# Patient Record
Sex: FEMALE | Race: WHITE
[De-identification: names, ages, dates, MRNs, and addresses within clinical notes are randomized per-mention and may not be internally consistent; named-entity substitution may affect disease eponyms.]

---

## 2020-03-01 ENCOUNTER — HOSPITAL ENCOUNTER (EMERGENCY)
Dept: HOSPITAL 88 - ER | Age: 80
Discharge: HOME | End: 2020-03-01
Payer: COMMERCIAL

## 2020-03-01 VITALS — WEIGHT: 116 LBS | BODY MASS INDEX: 20.55 KG/M2 | HEIGHT: 63 IN
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
## 2020-03-01 DIAGNOSIS — S02.80XA: Primary | ICD-10-CM
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
## 2020-03-01 DIAGNOSIS — I10: ICD-10-CM
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
## 2020-03-01 PROCEDURE — 99283 EMERGENCY DEPT VISIT LOW MDM: CPT

## 2020-03-01 PROCEDURE — 70486 CT MAXILLOFACIAL W/O DYE: CPT

## 2020-03-01 PROCEDURE — 70450 CT HEAD/BRAIN W/O DYE: CPT

## 2020-03-01 PROCEDURE — 72125 CT NECK SPINE W/O DYE: CPT

## 2020-03-01 NOTE — DIAGNOSTIC IMAGING REPORT
Examination: CT Face without Contrast

History:Fall with face injury and right eye laceration. 

Comparison studies: None



Technique:

Axial images were obtained through the maxillofacial region. Coronal and

sagittal reconstructions obtained from the axial data. 

Dose modulation, iterative reconstruction, and/or weight based adjustment of

the mA/kV was utilized to reduce the radiation dose to as low as reasonably

achievable. 

Intravenous contrast: None



Findings:



Soft tissues: 

Right periorbital soft tissue hematoma. 



Bones: 

Age indeterminate right lateral orbital wall fracture.

Chronic, healed fracture of the right zygomatic arch.



Orbits: 

Globes: Intact

Extra or intraconal abnormalities: None.



Paranasal sinuses:

Mild inflammatory mucosal thickening of the bilateral maxillary sinuses and

ethmoid air cells. 



Nasal cavity:

Patent. 

No septal deviation.



Incidental finding:

Bilateral facet arthropathy from C2-C3 through C5-C6 with disc osteophyte

complex at C3-C4 and C4-C5 with mild to moderate right foraminal narrowing.



IMPRESSION: 



1.  Right periorbital soft tissue hematoma with age indeterminate right lateral

orbital wall fracture. Intact right globe. 



2.  Chronic, healed fracture of the right zygomatic arch.



Signed by: Dr. Vida Briggs M.D. on 3/1/2020 10:26 AM

## 2020-03-01 NOTE — NUR
Recieved pt in rm 6 s/p mechanical trip and fall, no loc. Pt noted to have 
large hematoma to R eye. During triage pt noted to have hypertension. Medicated 
in triage. SBP above 220

## 2020-03-01 NOTE — DIAGNOSTIC IMAGING REPORT
Examination: CT CERVICAL SPINE WO CONTRAST



HISTORY:Neck pain and injury after fall.

COMPARISON:None.

TECHNIQUE: Multidetector helical axial images were obtained without contrast

from the foramen magnum to T1.  Coronal and sagittal reformatted images were

done. Bone and soft tissue windows were evaluated. 

Dose modulation, iterative reconstruction, and/or weight based adjustment of

the mA/kV was utilized to reduce the radiation dose to as low as reasonably

achievable. 



FINDINGS:  

Alignment:Normal alignment and lordosis.

Vertebrae:  Normal height and density. No acute fracture, infection or

neoplasm.

Disc space heights: Severe height loss at C4-C5.

Caliber of spinal canal: Developmentally normal.



Posterior fossa and craniocervical junction: Foramen magnum patent. No Chiari 1

malformation.



Soft tissues: No abnormality.



Degenerative changes:

C4-C5: Diffuse disc osteophyte complex and bilateral uncovertebral and facet

arthropathy result in mild bilateral neural foraminal narrowing. No canal

stenosis.

Bilateral facet arthropathy from C2-C3-C6-C7.

No disc herniation or canal stenosis.



Visualized lung apices:

No abnormalities.



IMPRESSION:

No acute abnormalities.



Signed by: Dr. Vida Briggs M.D. on 3/1/2020 10:33 AM

## 2020-03-01 NOTE — DIAGNOSTIC IMAGING REPORT
Examination: CT head without contrast

Clinical Indication: Fall with head injury.

Technique: Transaxial noncontrast images from the skull base through the vertex

were obtained. Sagittal and coronal reformatted images were done.

Dose modulation, iterative reconstruction, and/or weight based adjustment of

the mA/kV was utilized to reduce the radiation dose to as low as reasonably

achievable. 

Comparison: None.



Findings:



Scalp: No abnormalities.

Bones: Intact. No fractures.  No blastic or lytic lesions. 



Brain sulci: Mild generalized volume loss for patient's age.

Ventricles:  No hydrocephalus.



Extra-axial space:

No abnormalities.



Parenchyma: 

There are patchy areas of low-attenuation within subcortical and

periventricular white matter, nonspecific, but could represent microvascular

ischemic disease.

Chronic lacunar infarcts are identified in the right subinsular region, right

striatocapsular area, right globus pallidus, and bilateral thalami. 

No masses, hemorrhage, or acute or chronic cortical based vascular insults.



Suprasellar region: No abnormalities.

Craniocervical junction: The foramen magnum is patent.  No Chiari one

malformation.



Incidental findings: 

Atherosclerotic calcification of the cavernous and supraclinoid internal

carotid arteries.



Impression:



1.  No acute intracranial finding.



2.  Chronic microvascular ischemic change and generalized volume loss. 



3.  Chronic lacunar infarcts as above.



Signed by: Dr. Vida Briggs M.D. on 3/1/2020 10:20 AM

## 2023-05-18 ENCOUNTER — HOSPITAL ENCOUNTER (OUTPATIENT)
Dept: HOSPITAL 88 - RAD | Age: 83
End: 2023-05-18
Attending: FAMILY MEDICINE
Payer: MEDICARE

## 2023-05-18 DIAGNOSIS — I10: Primary | ICD-10-CM

## 2023-05-18 PROCEDURE — 93306 TTE W/DOPPLER COMPLETE: CPT
